# Patient Record
Sex: FEMALE | Race: WHITE | URBAN - METROPOLITAN AREA
[De-identification: names, ages, dates, MRNs, and addresses within clinical notes are randomized per-mention and may not be internally consistent; named-entity substitution may affect disease eponyms.]

---

## 2021-10-15 ENCOUNTER — OFFICE VISIT (OUTPATIENT)
Dept: URBAN - METROPOLITAN AREA CLINIC 23 | Facility: CLINIC | Age: 81
End: 2021-10-15
Payer: MEDICARE

## 2021-10-15 ENCOUNTER — DASHBOARD ENCOUNTERS (OUTPATIENT)
Age: 81
End: 2021-10-15

## 2021-10-15 ENCOUNTER — WEB ENCOUNTER (OUTPATIENT)
Dept: URBAN - METROPOLITAN AREA CLINIC 23 | Facility: CLINIC | Age: 81
End: 2021-10-15

## 2021-10-15 DIAGNOSIS — R10.32 LEFT LOWER QUADRANT PAIN: ICD-10-CM

## 2021-10-15 DIAGNOSIS — K59.09 CHRONIC CONSTIPATION: ICD-10-CM

## 2021-10-15 DIAGNOSIS — Z86.59 HISTORY OF DEMENTIA: ICD-10-CM

## 2021-10-15 PROCEDURE — 99203 OFFICE O/P NEW LOW 30 MIN: CPT | Performed by: STUDENT IN AN ORGANIZED HEALTH CARE EDUCATION/TRAINING PROGRAM

## 2021-10-15 NOTE — HPI-TODAY'S VISIT:
82 yo F dementia here for evaluation of abdominal pain. She is a very poor historian, and she presents with her  who helps provide HPI. She complains of symptoms of discomfort underneath her L rib, as well as left sided abdominal pain. Has occured intermittently for the past year.  She says the pain is sharp, does not feel like burning. Unsure if it is related to food intake. Occurs briefly for a  few seconds and then resolves.  Denies heartburn or acid reflux.  Appetite is good, she is not losing weight.  Pain occurs almost every other day.  She takes miralax PRN for constipation. She does endorse constipation. She does not have bowel movements every day but cannot recall how often she has them. She endorses straining and sensation of incomplete evacuation. She is not on a bowel regimen at this time.

## 2021-10-24 PROBLEM — 161465002: Status: ACTIVE | Noted: 2021-10-24
